# Patient Record
Sex: FEMALE | Race: BLACK OR AFRICAN AMERICAN | Employment: OTHER | URBAN - METROPOLITAN AREA
[De-identification: names, ages, dates, MRNs, and addresses within clinical notes are randomized per-mention and may not be internally consistent; named-entity substitution may affect disease eponyms.]

---

## 2022-09-01 ENCOUNTER — HOSPITAL ENCOUNTER (OUTPATIENT)
Dept: PHYSICAL THERAPY | Age: 77
Discharge: HOME | End: 2022-09-01
Payer: MEDICARE

## 2022-09-01 PROCEDURE — 97163 PT EVAL HIGH COMPLEX 45 MIN: CPT

## 2022-09-01 PROCEDURE — 97110 THERAPEUTIC EXERCISES: CPT

## 2022-09-01 PROCEDURE — 97112 NEUROMUSCULAR REEDUCATION: CPT

## 2022-09-01 NOTE — PROGRESS NOTES
[facility]  [address]  [place], [address]  [phone] fax    Plan of Care/ Statement of Necessity for Physical Therapy Services    Patient name: Leta Stevens Start of Care: 2022   Referral source: Dennis Mariano MD : 1945    Medical Diagnosis: Knee pain, left [M25.562]  Payor: VA MEDICARE / Plan: VA MEDICARE PART A & B / Product Type: Medicare /  Onset Date:Chronic    Treatment Diagnosis: Left knee pain   Prior Hospitalization: see medical history Provider#: [id number]   Medications: Verified on Patient summary List    Comorbidities: OA; right TKR   Prior Level of Function: Ambulated with SPC 2/2 end-stage OA      The Plan of Care and following information is based on the information from the initial evaluation. Assessment/ key information: 78y.o. year old female presents with CC of left knee pain that is consistent with end-stage OA. Impairments noted today: decreased patellar mobility; decreased PROM/AROM; decreased left LE strength grossly; impaired gait 2/2 genu valgus deformity on left. Patient has been referred to this facility preoperatively to improve mobility and strength prior to future TKR this fall. Patient will benefit from physical therapy to address deficits, and ultimately to return patient to prior level of function. Evaluation Complexity History MEDIUM  Complexity : 1-2 comorbidities / personal factors will impact the outcome/ POC ; Examination HIGH Complexity : 4+ Standardized tests and measures addressing body structure, function, activity limitation and / or participation in recreation  ;Presentation MEDIUM Complexity : Evolving with changing characteristics  ; Clinical Decision Making HIGH Complexity : FOTO score of 1- 25   Overall Complexity Rating: HIGH   Problem List: pain affecting function, decrease ROM, decrease strength, impaired gait/ balance, decrease ADL/ functional abilitiies, decrease activity tolerance, decrease flexibility/ joint mobility, and decrease transfer abilities   Treatment Plan may include any combination of the following: Therapeutic exercise, Neuromuscular re-education, Physical agent/modality, Gait/balance training, Manual therapy, and Patient education  Patient / Family readiness to learn indicated by: asking questions, trying to perform skills, and interest  Persons(s) to be included in education: patient (P)  Barriers to Learning/Limitations: None  Patient Goal (s): to get my knee moving better  Patient Self Reported Health Status: good  Rehabilitation Potential: good    Short Term Goals: To be accomplished in 2 weeks:  1. I and compliant with HEP for self management of symptoms. Long Term Goals: To be accomplished in 4 weeks:  1. Improve FOTO to 45 to indicate improved function with daily activities. 2. Increase left knee AROM to 0-95 degrees to increase with sit<>stand. 3. Increase left hip abd and ext strength to 3+/5 to improve stability for ambulating household distances. Frequency / Duration: Patient to be seen 1 times per week for 4 weeks. Patient/ Caregiver education and instruction: Diagnosis, prognosis, exercises   [x]  Plan of care has been reviewed with PTA    Certification Period: 9/1/22-10/1/22  Zarina Colin, PT 9/1/2022 11:57 AM    ________________________________________________________________________    I certify that the above Therapy Services are being furnished while the patient is under my care. I agree with the treatment plan and certify that this therapy is necessary.     [de-identified] Signature:____________________  Date:____________Time: _________     Nette Stratton MD  Please sign and return to [facility] [address] [place] [address] Blvd  [address]  [place], [address]  [phone] fax

## 2022-09-01 NOTE — PROGRESS NOTES
PT DAILY TREATMENT NOTE 10-18    Patient Name: Angeli Farmer  Date:2022  : 1945  [x]  Patient  Verified  Payor: VA MEDICARE / Plan: VA MEDICARE PART A & B / Product Type: Medicare /    In time:1113  Out time:1148  Total Treatment Time (min): 35  Visit #: 1 of 4    Medicare/BCBS Only   Total Timed Codes (min):  23 1:1 Treatment Time:  35       Treatment Area: Knee pain, left [M25.562]    SUBJECTIVE  Pain Level (0-10 scale): 6-7  Any medication changes, allergies to medications, adverse drug reactions, diagnosis change, or new procedure performed?: [x] No    [] Yes (see summary sheet for update)  Subjective functional status/changes:   [] No changes reported  See eval    OBJECTIVE    12 min [x]Eval                  []Re-Eval       15 min Therapeutic Exercise:  [] See flow sheet :   Rationale: increase ROM and increase strength to improve the patients ability to perform daily activities with less pain     8 min Neuromuscular Re-education:  []  See flow sheet :KT tape: 50% patellar lift/correction medially and laterally   Rationale: increase ROM  to improve the patients ability to improve patellar gliding for daily activities     With   [] TE   [] TA   [] neuro   [] other: Patient Education: [x] Review HEP    [] Progressed/Changed HEP based on:   [] positioning   [] body mechanics   [] transfers   [] heat/ice application    [] other:      Other Objective/Functional Measures:      Pain Level (0-10 scale) post treatment: 0    ASSESSMENT/Changes in Function: see POC    Patient will continue to benefit from skilled PT services to modify and progress therapeutic interventions, address functional mobility deficits, address ROM deficits, address strength deficits, analyze and cue movement patterns, and address imbalance/dizziness to attain remaining goals.      [x]  See Plan of Care  []  See progress note/recertification  []  See Discharge Summary         Progress towards goals / Updated goals:  Short Term Goals: To be accomplished in 2 weeks:  1. I and compliant with HEP for self management of symptoms. IE: issued HEP  Long Term Goals: To be accomplished in 4 weeks:  1. Improve FOTO to 45 to indicate improved function with daily activities. IE:19  2. Increase left knee AROM to 0-95 degrees to increase with sit<>stand. IE:-3-78  3. Increase left hip abd and ext strength to 3+/5 to improve stability for ambulating household distances.    IE:2+3-/5  PLAN  []  Upgrade activities as tolerated     [x]  Continue plan of care  []  Update interventions per flow sheet       []  Discharge due to:_  []  Other:_      Tessie Delgadillo, LIZ, CMTPT 9/1/2022  12:11 PM    Future Appointments   Date Time Provider Nydia Munoz   9/7/2022 10:30 AM Anita Flannery PTA [facility]   9/14/2022 12:45 PM Moreno Shah PT [facility]   9/21/2022 11:15 AM Moreno Shah PT [facility]

## 2022-09-07 ENCOUNTER — APPOINTMENT (OUTPATIENT)
Dept: PHYSICAL THERAPY | Age: 77
End: 2022-09-07
Payer: MEDICARE

## 2022-09-14 ENCOUNTER — HOSPITAL ENCOUNTER (OUTPATIENT)
Dept: PHYSICAL THERAPY | Age: 77
Discharge: HOME | End: 2022-09-14
Payer: MEDICARE

## 2022-09-14 PROCEDURE — 97140 MANUAL THERAPY 1/> REGIONS: CPT

## 2022-09-14 PROCEDURE — 97112 NEUROMUSCULAR REEDUCATION: CPT

## 2022-09-14 PROCEDURE — 97110 THERAPEUTIC EXERCISES: CPT

## 2022-09-14 NOTE — PROGRESS NOTES
PT DAILY TREATMENT NOTE 10    Patient Name: Heydi Davila  Date:2022  : 1945  [x]  Patient  Verified  Payor: VA MEDICARE / Plan: VA MEDICARE PART A & B / Product Type: Medicare /    In time:1251  Out time:143  Total Treatment Time (min): 52  Visit #: 2 of 4    Medicare/BCBS Only   Total Timed Codes (min):  42 1:1 Treatment Time:  42       Treatment Area: Knee pain, left [M25.562]    SUBJECTIVE  Pain Level (0-10 scale): 4-5  Any medication changes, allergies to medications, adverse drug reactions, diagnosis change, or new procedure performed?: [x] No    [] Yes (see summary sheet for update)  Subjective functional status/changes:   [] No changes reported  I haven't been able to do much because I had gout last week. OBJECTIVE    Modality rationale: decrease pain to improve the patients ability to tolerate post exercise soreness   Min Type Additional Details   10 [x]  Ice     []  heat  []  Ice massage  []  Laser   []  Anodyne Position:long sitting  Location:left knee   [] Skin assessment post-treatment:  []intact []redness- no adverse reaction    []redness - adverse reaction:         26 min Therapeutic Exercise:  [] See flow sheet :   Rationale: increase ROM and increase strength to improve the patients ability to perform daily activities      8 min Neuromuscular Re-education:  []  See flow sheet :   Rationale: increase ROM, increase strength, improve coordination, improve balance, and increase proprioception  to improve the patients ability to improve     8 min Manual Therapy:  grade 2-3 left patellar mobs and tib/fib mobs; STM popliteus   The manual therapy interventions were performed at a separate and distinct time from the therapeutic activities interventions.   Rationale: decrease pain, increase ROM, and increase tissue extensibility to improve mobility and ROM for daily activities   With   [] TE   [] TA   [] neuro   [] other: Patient Education: [x] Review HEP    [] Progressed/Changed HEP based on:   [] positioning   [] body mechanics   [] transfers   [] heat/ice application    [] other:      Other Objective/Functional Measures:      Pain Level (0-10 scale) post treatment: 2-3    ASSESSMENT/Changes in Function: Significant genu valgus deformity on left. Unable to get through entire program 2/2 moving slowly through most exercises. Patient will continue to benefit from skilled PT services to modify and progress therapeutic interventions, address functional mobility deficits, address ROM deficits, address strength deficits, and analyze and cue movement patterns to attain remaining goals. []  See Plan of Care  []  See progress note/recertification  []  See Discharge Summary         Progress towards goals / Updated goals:  Short Term Goals: To be accomplished in 2 weeks:  1. I and compliant with HEP for self management of symptoms. IE: issued HEP  Current: attempted a couple of them per patient 9/14/22  Long Term Goals: To be accomplished in 4 weeks:  1. Improve FOTO to 45 to indicate improved function with daily activities. IE:19  2. Increase left knee AROM to 0-95 degrees to increase with sit<>stand. IE:-3-78  Current: no change 9/14/22  3. Increase left hip abd and ext strength to 3+/5 to improve stability for ambulating household distances.    IE:2+3-/5    PLAN  [x]  Upgrade activities as tolerated     []  Continue plan of care  []  Update interventions per flow sheet       []  Discharge due to:_  []  Other:_      LIZ Orantes, CMTPT 9/14/2022  8:53 AM    Future Appointments   Date Time Provider Nydia Munoz   9/14/2022 12:45 PM Radha Garza PT [facility]   9/21/2022 11:15 AM [facility]

## 2022-09-21 ENCOUNTER — HOSPITAL ENCOUNTER (OUTPATIENT)
Dept: PHYSICAL THERAPY | Age: 77
End: 2022-09-21
Payer: MEDICARE

## 2022-09-21 ENCOUNTER — HOSPITAL ENCOUNTER (OUTPATIENT)
Dept: PHYSICAL THERAPY | Age: 77
Discharge: HOME | End: 2022-09-21
Payer: MEDICARE

## 2022-09-21 ENCOUNTER — TELEPHONE (OUTPATIENT)
Dept: PHYSICAL THERAPY | Age: 77
End: 2022-09-21

## 2022-09-21 PROCEDURE — 97140 MANUAL THERAPY 1/> REGIONS: CPT

## 2022-09-21 PROCEDURE — 97112 NEUROMUSCULAR REEDUCATION: CPT

## 2022-09-21 PROCEDURE — 97110 THERAPEUTIC EXERCISES: CPT

## 2022-09-21 NOTE — PROGRESS NOTES
PT DAILY TREATMENT NOTE     Patient Name: Dai Petersen  Date:2022  : 1945  [x]  Patient  Verified  Payor: VA MEDICARE / Plan: VA MEDICARE PART A & B / Product Type: Medicare /    In time:2:13  Out time:3:10  Total Treatment Time (min): 62  Visit #: 3 of 4    Medicare/BCBS Only   Total Timed Codes (min):  47 1:1 Treatment Time:  47       Treatment Area: Knee pain, left [M25.562]    SUBJECTIVE  Pain Level (0-10 scale): 7-8  Any medication changes, allergies to medications, adverse drug reactions, diagnosis change, or new procedure performed?: [x] No    [] Yes (see summary sheet for update)  Subjective functional status/changes:   [] No changes reported  Pt reports she is not doing too good today and she has a lot of pain and stiffness in her left knee. OBJECTIVE    Modality rationale: decrease inflammation and decrease pain to improve the patients ability to perform ADL's with ease.    Min Type Additional Details    [] Estim:  []Unatt       []IFC  []Premod                        []Other:  []w/ice   []w/heat  Position:  Location:    [] Estim: []Att    []TENS instruct  []NMES                    []Other:  []w/US   []w/ice   []w/heat  Position:  Location:    []  Traction: [] Cervical       []Lumbar                       [] Prone          []Supine                       []Intermittent   []Continuous Lbs:  [] before manual  [] after manual    []  Ultrasound: []Continuous   [] Pulsed                           []1MHz   []3MHz W/cm2:  Location:    []  Iontophoresis with dexamethasone         Location: [] Take home patch   [] In clinic   10 [x]  Ice     []  heat  []  Ice massage  []  Laser   []  Anodyne Position: long sit  Location: left knee    []  Laser with stim  []  Other:  Position:  Location:    []  Vasopneumatic Device    []  Right     []  Left  Pre-treatment girth:  Post-treatment girth:  Measured at (location):  Pressure:       [] lo [] med [] hi   Temperature: [] lo [] med [] hi   [x] Skin assessment post-treatment:  [x]intact []redness- no adverse reaction    []redness - adverse reaction:         29 min Therapeutic Exercise:  [x] See flow sheet :   Rationale: increase ROM and increase strength to improve the patients ability to perform functional task with ease. 10 min Neuromuscular Re-education:  [x]  See flow sheet :   Rationale: increase strength, improve coordination, improve balance, and increase proprioception  to improve the patients ability to perform ADL's with ease. 8 min Manual Therapy: grade 2-3 left patellar mobs and tib/fib mobs; STM popliteus   The manual therapy interventions were performed at a separate and distinct time from the therapeutic activities interventions. Rationale: decrease pain, increase ROM, and increase tissue extensibility to improve functional mobility with ambulation ADL's. With   [] TE   [] TA   [] neuro   [] other: Patient Education: [x] Review HEP    [] Progressed/Changed HEP based on:   [] positioning   [] body mechanics   [] transfers   [] heat/ice application    [] other:      Other Objective/Functional Measures:   Hip x 3- 10x ea     Pain Level (0-10 scale) post treatment: 0-1 (numb)    ASSESSMENT/Changes in Function:   Hip x 3 initiated bilaterally to strengthen the B hips and aid with increased left knee stability. Continued fatigue with therex however pt puts forth good effort. Pt notes increased pain with left knee flexion at the lateral/lateroposterior left knee. Good relief reported with taping. Pt notes no pain with cold modality and left in no distress.     Patient will continue to benefit from skilled PT services to modify and progress therapeutic interventions, address functional mobility deficits, address ROM deficits, address strength deficits, analyze and address soft tissue restrictions, analyze and cue movement patterns, analyze and modify body mechanics/ergonomics, and assess and modify postural abnormalities to attain remaining goals. [x]  See Plan of Care  []  See progress note/recertification  []  See Discharge Summary         Progress towards goals / Updated goals:  Short Term Goals: To be accomplished in 2 weeks:  1. I and compliant with HEP for self management of symptoms. IE: issued HEP  Current: attempted a couple of them per patient 9/14/22  Long Term Goals: To be accomplished in 4 weeks:  1. Improve FOTO to 45 to indicate improved function with daily activities. IE:19  2. Increase left knee AROM to 0-95 degrees to increase with sit<>stand. IE:-3-78  Current: no change 9/14/22 -3-78  3. Increase left hip abd and ext strength to 3+/5 to improve stability for ambulating household distances.    IE:2+3-/5    PLAN  []  Upgrade activities as tolerated     [x]  Continue plan of care  []  Update interventions per flow sheet       []  Discharge due to:_  []  Other:_      Latonia Pruitt PTA 9/21/2022  2:17 PM    Future Appointments   Date Time Provider Nydia Munoz   9/23/2022  3:00 PM Isabel Ramirez PTA [facility]

## 2022-09-23 ENCOUNTER — APPOINTMENT (OUTPATIENT)
Dept: PHYSICAL THERAPY | Age: 77
End: 2022-09-23
Payer: MEDICARE

## 2022-11-07 NOTE — PROGRESS NOTES
[facility]  [address] [facility] [address]  [place], [address]  [phone] fax    Physical Therapy Discharge Summary  Patient name: Joana Mendez Start of Care: 22   Referral source: Tonja Jeffries MD : 1945   Medical/Treatment Diagnosis: Knee pain, left [M25.562]  Payor: Alexi Soto / Plan: VA MEDICARE PART A & B / Product Type: Medicare /  Onset Date:Chronic     Prior Hospitalization: see medical history Provider#: [id number]   Medications: Verified on Patient Summary List     Comorbidities: OA; right TKR   Prior Level of Function: Ambulated with SPC 2/2 end-stage OA                            Visits from Start of Care: 3    Missed Visits: 2  Reporting Period : 22 to 22      Summary of Care:  Short Term Goals: To be accomplished in 2 weeks:  1. I and compliant with HEP for self management of symptoms. IE: issued HEP  Current: attempted a couple of them per patient  Long Term Goals: To be accomplished in 4 weeks:  1. Improve FOTO to 45 to indicate improved function with daily activities. IE:19  Current: unable to reassess   2. Increase left knee AROM to 0-95 degrees to increase with sit<>stand. IE:-3-78  Current: no change -3-78  3. Increase left hip abd and ext strength to 3+/5 to improve stability for ambulating household distances. IE:2+3-/5  Current: unable to reassess   Patient has not returned to PT since 22; unable to reassess goals; unplanned D/C.     ASSESSMENT/RECOMMENDATIONS:  [x]Discontinue therapy: []Patient has reached or is progressing toward set goals      [x]Patient is non-compliant or has abdicated      []Due to lack of appreciable progress towards set [address], PT 2022 11:42 AM